# Patient Record
Sex: FEMALE | URBAN - METROPOLITAN AREA
[De-identification: names, ages, dates, MRNs, and addresses within clinical notes are randomized per-mention and may not be internally consistent; named-entity substitution may affect disease eponyms.]

---

## 2023-08-21 ENCOUNTER — APPOINTMENT (OUTPATIENT)
Dept: URBAN - METROPOLITAN AREA CLINIC 245 | Age: 48
Setting detail: DERMATOLOGY
End: 2023-08-22

## 2023-08-21 DIAGNOSIS — L259 CONTACT DERMATITIS AND OTHER ECZEMA, UNSPECIFIED CAUSE: ICD-10-CM

## 2023-08-21 DIAGNOSIS — L81.4 OTHER MELANIN HYPERPIGMENTATION: ICD-10-CM

## 2023-08-21 DIAGNOSIS — D22 MELANOCYTIC NEVI: ICD-10-CM

## 2023-08-21 DIAGNOSIS — D18.0 HEMANGIOMA: ICD-10-CM

## 2023-08-21 PROBLEM — L23.9 ALLERGIC CONTACT DERMATITIS, UNSPECIFIED CAUSE: Status: ACTIVE | Noted: 2023-08-21

## 2023-08-21 PROBLEM — D18.01 HEMANGIOMA OF SKIN AND SUBCUTANEOUS TISSUE: Status: ACTIVE | Noted: 2023-08-21

## 2023-08-21 PROBLEM — D22.5 MELANOCYTIC NEVI OF TRUNK: Status: ACTIVE | Noted: 2023-08-21

## 2023-08-21 PROCEDURE — OTHER COUNSELING: TOPICAL STEROIDS: OTHER

## 2023-08-21 PROCEDURE — OTHER PRESCRIPTION: OTHER

## 2023-08-21 PROCEDURE — OTHER COUNSELING: OTHER

## 2023-08-21 PROCEDURE — OTHER PRESCRIPTION MEDICATION MANAGEMENT: OTHER

## 2023-08-21 PROCEDURE — OTHER MIPS QUALITY: OTHER

## 2023-08-21 PROCEDURE — 99213 OFFICE O/P EST LOW 20 MIN: CPT

## 2023-08-21 RX ORDER — TRIAMCINOLONE ACETONIDE 1 MG/G
OINTMENT TOPICAL
Qty: 15 | Refills: 1 | Status: ERX | COMMUNITY
Start: 2023-08-21

## 2023-08-21 ASSESSMENT — LOCATION DETAILED DESCRIPTION DERM
LOCATION DETAILED: RIGHT MID-UPPER BACK
LOCATION DETAILED: LEFT SUPERIOR UPPER BACK
LOCATION DETAILED: PHILTRUM
LOCATION DETAILED: RIGHT INFERIOR UPPER BACK

## 2023-08-21 ASSESSMENT — LOCATION SIMPLE DESCRIPTION DERM
LOCATION SIMPLE: RIGHT UPPER BACK
LOCATION SIMPLE: UPPER LIP
LOCATION SIMPLE: LEFT UPPER BACK

## 2023-08-21 ASSESSMENT — LOCATION ZONE DERM
LOCATION ZONE: TRUNK
LOCATION ZONE: LIP

## 2023-08-21 NOTE — PROCEDURE: PRESCRIPTION MEDICATION MANAGEMENT
Initiate Treatment: Triamcinolone 0.1% ointment BID for 1 week
Render In Strict Bullet Format?: No
Detail Level: Zone

## 2024-06-19 ENCOUNTER — TELEPHONE (OUTPATIENT)
Dept: NEUROLOGY | Facility: CLINIC | Age: 49
End: 2024-06-19

## 2024-06-19 ENCOUNTER — OFFICE VISIT (OUTPATIENT)
Dept: NEUROLOGY | Facility: CLINIC | Age: 49
End: 2024-06-19

## 2024-06-19 VITALS
DIASTOLIC BLOOD PRESSURE: 87 MMHG | BODY MASS INDEX: 34 KG/M2 | RESPIRATION RATE: 16 BRPM | HEART RATE: 87 BPM | SYSTOLIC BLOOD PRESSURE: 138 MMHG | WEIGHT: 162 LBS | HEIGHT: 58 IN

## 2024-06-19 DIAGNOSIS — G43.109 MIGRAINE WITH AURA AND WITHOUT STATUS MIGRAINOSUS, NOT INTRACTABLE: Primary | ICD-10-CM

## 2024-06-19 PROCEDURE — 3075F SYST BP GE 130 - 139MM HG: CPT | Performed by: PHYSICIAN ASSISTANT

## 2024-06-19 PROCEDURE — 99203 OFFICE O/P NEW LOW 30 MIN: CPT | Performed by: PHYSICIAN ASSISTANT

## 2024-06-19 PROCEDURE — 3008F BODY MASS INDEX DOCD: CPT | Performed by: PHYSICIAN ASSISTANT

## 2024-06-19 PROCEDURE — 3079F DIAST BP 80-89 MM HG: CPT | Performed by: PHYSICIAN ASSISTANT

## 2024-06-19 RX ORDER — ONDANSETRON 4 MG/1
4 TABLET, ORALLY DISINTEGRATING ORAL EVERY 8 HOURS PRN
Qty: 20 TABLET | Refills: 0 | Status: SHIPPED | OUTPATIENT
Start: 2024-06-19

## 2024-06-19 RX ORDER — FEXOFENADINE HCL 60 MG/1
60 TABLET, FILM COATED ORAL
COMMUNITY

## 2024-06-19 RX ORDER — ALBUTEROL SULFATE 90 UG/1
2 AEROSOL, METERED RESPIRATORY (INHALATION) EVERY 6 HOURS PRN
COMMUNITY
Start: 2023-07-26

## 2024-06-19 RX ORDER — UBROGEPANT 100 MG/1
TABLET ORAL
Qty: 3 TABLET | Refills: 0 | COMMUNITY
Start: 2024-06-19 | End: 2025-06-19

## 2024-06-19 RX ORDER — RIZATRIPTAN BENZOATE 10 MG/1
10 TABLET ORAL
COMMUNITY

## 2024-06-19 NOTE — PROGRESS NOTES
NEUROLOGY  [facility]       Brit Ricks  5/27/1975  Primary Care Provider:  VIRGEN MOLINA    6/19/2024  49 year old female,      Seen for/plans last visit:  Migraines    Accompanied visit: No    Present condition:  Started with migraines in the 7th grade. She recalls that she got an aura in math class with associated headache and vomiting. The migraines did increase in frequency in highschool  She would get a visual aura and then about 20 minutes later headache. She did see neurologist in her 20's tried zomig and then maxalt. She did have a few during her pregnancy. Then about [age] years old she noticed an increase in the migraines. She is wondering if the increase in frequency is due to perimenopause. She is not sleeping well at night. No longer getting a consistent menstrual cycle. She has been dealing with mood issues.    Typically the headaches are right side of forehead and base of neck on right side. Can vary in intensity.  They vary in frequency. She has recently been waking up with them more often. There is associated +photophobia, +phonophobia, +nausea and occasional vomiting. She has experienced paresthesias with her migraines in the past.  She has not been getting the visual aura with her headaches recently. Can sometimes use ibpuorofen and ice pack.   There are times she has needed to take a second dose of the maxalt. She has not had any recent imaging of her brain.       Review of Systems:  Review of Systems:  Denies systemic symptoms     No CP or SOB.  No GI or  symptoms. Relevant Neuro as noted above.    Past Medical History:    Exercise-induced asthma (HCC)    Gestational diabetes (HCC)    Migraines       Medications:      Current Outpatient Medications:     albuterol (PROAIR HFA) 108 (90 Base) MCG/ACT Inhalation Aero Soln, Inhale 2 puffs into the lungs every 6 (six) hours as needed., Disp: , Rfl:     Rizatriptan Benzoate 10 MG Oral Tab, Take 1 tablet (10 mg total) by mouth.  TAKE  1 TABLET BY MOUTH AT ONSET OF HEADACHE AS NEEDED. MAY REPEAT IN 2 HOURS, Disp: , Rfl:     fexofenadine (ALLEGRA ALLERGY) 60 MG Oral Tab, Take 1 tablet (60 mg total) by mouth daily as needed., Disp: , Rfl:     ubrogepant (UBRELVY) 100 MG Oral Tab, Take one tablet at onset of migraine.  May take additional tablet in 2 hours if needed.  Do not exceed two tablets per 24 hour period., Disp: 3 tablet, Rfl: 0    ondansetron 4 MG Oral Tablet Dispersible, Take 1 tablet (4 mg total) by mouth every 8 (eight) hours as needed for Nausea., Disp: 20 tablet, Rfl: 0  PRN:     Allergies:  Allergies   Allergen Reactions    Latex OTHER (SEE COMMENTS)          EXAM:  /87 (BP Location: Left arm, Patient Position: Sitting, Cuff Size: adult)   Pulse 87   Resp 16   Ht 58\"   Wt 162 lb (73.5 kg)   LMP 05/23/2024 (Exact Date)   BMI 33.86 kg/m²   Looks stated age  General Exam:  HENT:  pink conjunctiva anicteric sclerae  Neck no adenopathy, thyroid normal  Heart and Lungs:  normal  Extremities: no cyanosis, skin changes    NEURO  NAD, A&Ox3  EOMI  CN II-XII intact  Strength 5/5 all extremties  DTRs 2+ and symmetric  FTN intact bilaterally  No drift on exam  Normal gait  Tandem gait intact  Romberg negative      Problem/s Identified this visit:   1. Migraine with aura and without status migrainosus, not intractable          Discussion plus Diagnostics & Treatment Orders:    Patient is a 49 year old female here with hx of migraines since she was a kid but have recently increased in frequency and there has been a change in that she is waking up with them and not having her typical associated visual aura. Will get MRI Brain to further evaluate. She was given samples of ubrelvy to try for abortive tx. Discussed possible side effects of Ubrelvy with patient. If effective she was instructed to call the office for a prescription. If not effective then can continue the maxalt for abortive tx.    (X) Discussed potential side effects of any  treatment relevant to above.  Includes explanation of tests as necessary.    Return in about 3 months (around 9/19/2024).      Patient understands that if needed, based on condition and or test results, follow up will be readjusted      Pat Zavala PA-C  [facility]  6/19/2024, Time completed 10:22 AM

## 2024-06-19 NOTE — TELEPHONE ENCOUNTER
Patient advised she would like to compelete MRI imaging ordered by Pat Zavala at [facility].     [address], [place] [address] //  [phone]    Please advise prior authorization and call patient when approved.

## 2024-06-19 NOTE — PATIENT INSTRUCTIONS
Refill policies:    Allow 2-3 business days for refills; controlled substances may take longer.  Contact your pharmacy at least 5 days prior to running out of medication and have them send an electronic request or submit request through the “request refill” option in your [employer] account.  Refills are not addressed on weekends; covering physicians do not authorize routine medications on weekends.  No narcotics or controlled substances are refilled after noon on Fridays or by on call physicians.  By law, narcotics must be electronically prescribed.  A 30 day supply with no refills is the maximum allowed.  If your prescription is due for a refill, you may be due for a follow up appointment.  To best provide you care, patients receiving routine medications need to be seen at least once a year.  Patients receiving narcotic/controlled substance medications need to be seen at least once every 3 months.  In the event that your preferred pharmacy does not have the requested medication in stock (e.g. Backordered), it is your responsibility to find another pharmacy that has the requested medication available.  We will gladly send a new prescription to that pharmacy at your request.    Scheduling Tests:    If your physician has ordered radiology tests such as MRI or CT scans, please contact Central Scheduling at [phone] right away to schedule the test.  Once scheduled, the [facility] Centralized Referral Team will work with your insurance carrier to obtain pre-certification or prior authorization.  Depending on your insurance carrier, approval may take 3-10 days.  It is highly recommended patients assure they have received an authorization before having a test performed.  If test is done without insurance authorization, patient may be responsible for the entire amount billed.      Precertification and Prior Authorizations:  If your physician has recommended that you have a procedure or additional testing performed the [facility]  Centralized Referral Team will contact your insurance carrier to obtain pre-certification or prior authorization.    You are strongly encouraged to contact your insurance carrier to verify that your procedure/test has been approved and is a COVERED benefit.  Although the [facility] Centralized Referral Team does its due diligence, the insurance carrier gives the disclaimer that \"Although the procedure is authorized, this does not guarantee payment.\"    Ultimately the patient is responsible for payment.   Thank you for your understanding in this matter.  Paperwork Completion:  If you require FMLA or disability paperwork for your recovery, please make sure to either drop it off or have it faxed to our office at [phone]. Be sure the form has your name and date of birth on it.  The form will be faxed to our Forms Department and they will complete it for you.  There is a 25$ fee for all forms that need to be filled out.  Please be aware there is a 10-14 day turnaround time.  You will need to sign a release of information (JUAN MANUEL) form if your paperwork does not come with one.  You may call the Forms Department with any questions at [phone].  Their fax number is [phone].

## 2024-07-16 NOTE — TELEPHONE ENCOUNTER
Medication: Ubrelvy     Date of last refill: n/a (#3/0)  [place]     Last office visit: 6/19/24  Due back to clinic per last office note:  Return in about 3 months   Date next office visit scheduled:    Future Appointments   Date Time Provider Department Center   9/4/2024  3:00 PM Pat Zavala PA [facility]           Last OV note recommendation:    Problem/s Identified this visit:   1. Migraine with aura and without status migrainosus, not intractable             Discussion plus Diagnostics & Treatment Orders:     Patient is a 49 year old female here with hx of migraines since she was a kid but have recently increased in frequency and there has been a change in that she is waking up with them and not having her typical associated visual aura. Will get MRI Brain to further evaluate. She was given samples of ubrelvy to try for abortive tx. Discussed possible side effects of Ubrelvy with patient. If effective she was instructed to call the office for a prescription. If not effective then can continue the maxalt for abortive tx.     (X) Discussed potential side effects of any treatment relevant to above.  Includes explanation of tests as necessary.     Return in about 3 months (around 9/19/2024).        Patient understands that if needed, based on condition and or test results, follow up will be readjusted        Pat Zavala PA-C

## 2024-07-17 RX ORDER — UBROGEPANT 100 MG/1
TABLET ORAL
Qty: 10 TABLET | Refills: 3 | Status: SHIPPED | OUTPATIENT
Start: 2024-07-17 | End: 2025-07-16

## 2024-09-04 ENCOUNTER — TELEPHONE (OUTPATIENT)
Dept: NEUROLOGY | Facility: CLINIC | Age: 49
End: 2024-09-04

## 2024-09-04 ENCOUNTER — OFFICE VISIT (OUTPATIENT)
Dept: NEUROLOGY | Facility: CLINIC | Age: 49
End: 2024-09-04
Payer: COMMERCIAL

## 2024-09-04 VITALS
SYSTOLIC BLOOD PRESSURE: 137 MMHG | BODY MASS INDEX: 36.74 KG/M2 | HEIGHT: 58.5 IN | DIASTOLIC BLOOD PRESSURE: 90 MMHG | RESPIRATION RATE: 18 BRPM | WEIGHT: 179.81 LBS | HEART RATE: 78 BPM

## 2024-09-04 DIAGNOSIS — G43.109 MIGRAINE WITH AURA AND WITHOUT STATUS MIGRAINOSUS, NOT INTRACTABLE: Primary | ICD-10-CM

## 2024-09-04 DIAGNOSIS — M26.643 ARTHRITIS OF BOTH TEMPOROMANDIBULAR JOINTS: ICD-10-CM

## 2024-09-04 PROCEDURE — 3080F DIAST BP >= 90 MM HG: CPT | Performed by: PHYSICIAN ASSISTANT

## 2024-09-04 PROCEDURE — 3008F BODY MASS INDEX DOCD: CPT | Performed by: PHYSICIAN ASSISTANT

## 2024-09-04 PROCEDURE — 3075F SYST BP GE 130 - 139MM HG: CPT | Performed by: PHYSICIAN ASSISTANT

## 2024-09-04 PROCEDURE — 99213 OFFICE O/P EST LOW 20 MIN: CPT | Performed by: PHYSICIAN ASSISTANT

## 2024-09-04 RX ORDER — FREMANEZUMAB-VFRM 225 MG/1.5ML
225 INJECTION SUBCUTANEOUS
Qty: 1 EACH | Refills: 5 | Status: SHIPPED | OUTPATIENT
Start: 2024-09-04

## 2024-09-04 RX ORDER — FREMANEZUMAB-VFRM 225 MG/1.5ML
225 INJECTION SUBCUTANEOUS
Qty: 2 EACH | Refills: 0 | COMMUNITY
Start: 2024-09-04

## 2024-09-04 NOTE — TELEPHONE ENCOUNTER
Patient presented to office with MRI imaging from [facility]. Uploaded the following studies via [employer] reader:    MRI Brain w/w.o DOS: 8/3/24    Disk returned to patient during appointment.

## 2024-09-04 NOTE — PATIENT INSTRUCTIONS
Refill policies:    Allow 2-3 business days for refills; controlled substances may take longer.  Contact your pharmacy at least 5 days prior to running out of medication and have them send an electronic request or submit request through the “request refill” option in your [employer] account.  Refills are not addressed on weekends; covering physicians do not authorize routine medications on weekends.  No narcotics or controlled substances are refilled after noon on Fridays or by on call physicians.  By law, narcotics must be electronically prescribed.  A 30 day supply with no refills is the maximum allowed.  If your prescription is due for a refill, you may be due for a follow up appointment.  To best provide you care, patients receiving routine medications need to be seen at least once a year.  Patients receiving narcotic/controlled substance medications need to be seen at least once every 3 months.  In the event that your preferred pharmacy does not have the requested medication in stock (e.g. Backordered), it is your responsibility to find another pharmacy that has the requested medication available.  We will gladly send a new prescription to that pharmacy at your request.    Scheduling Tests:    If your physician has ordered radiology tests such as MRI or CT scans, please contact Central Scheduling at [phone] right away to schedule the test.  Once scheduled, the [facility] Centralized Referral Team will work with your insurance carrier to obtain pre-certification or prior authorization.  Depending on your insurance carrier, approval may take 3-10 days.  It is highly recommended patients assure they have received an authorization before having a test performed.  If test is done without insurance authorization, patient may be responsible for the entire amount billed.      Precertification and Prior Authorizations:  If your physician has recommended that you have a procedure or additional testing performed the [facility]  Centralized Referral Team will contact your insurance carrier to obtain pre-certification or prior authorization.    You are strongly encouraged to contact your insurance carrier to verify that your procedure/test has been approved and is a COVERED benefit.  Although the [facility] Centralized Referral Team does its due diligence, the insurance carrier gives the disclaimer that \"Although the procedure is authorized, this does not guarantee payment.\"    Ultimately the patient is responsible for payment.   Thank you for your understanding in this matter.  Paperwork Completion:  If you require FMLA or disability paperwork for your recovery, please make sure to either drop it off or have it faxed to our office at [phone]. Be sure the form has your name and date of birth on it.  The form will be faxed to our Forms Department and they will complete it for you.  There is a 25$ fee for all forms that need to be filled out.  Please be aware there is a 10-14 day turnaround time.  You will need to sign a release of information (JUAN MANUEL) form if your paperwork does not come with one.  You may call the Forms Department with any questions at [phone].  Their fax number is [phone].

## 2024-09-04 NOTE — PROGRESS NOTES
NEUROLOGY  [facility]       Previous visit and existing record notes reviewed in preparation for the face to face visit.  Relevant labs and studies reviewed and will be noted in relevant areas of this record.    Brit Ricks  5/27/1975  Primary Care Provider:  VIRGEN MOLINA    9/4/2024  49 year old female,      was last seen on: Patient was last seen 6/19/24 for first visit with hx of migraines since she was a kid with increase in frequency in migraines around [age] and thought it might be due to perimenopause. At that time an MRI Brain was ordered and she was given ubrelvy for abortive tx    Seen for/plans last visit:  Migraines    Accompanied visit: No    Present condition:  Since her last visit she has tried the ubrelvy and it does work to abort the headache. She tolerates it without side effects. The headaches have increased in frequency in the last couple of weeks. She is an  and is under increased stress due to some murder trials she has upcoming. She is also not sleeping well at night. She is considering trying some hormone replacement therapy for her perimenopause symptoms. She is concerned about stroke risk with taking hormone replacement. She is interested in possibly trying a preventative medications for her headaches  She brought her MRI images for review.     MRI Brain(8/3/24)  Impression:  Normal brain.  Appearance of an enlarged partially empty sella  High Grade R>L TMJ DJD, a possible association with/cause of stress/tension headaches    Past History update/new problem(s):     Review of Systems:  Review of Systems:  Denies systemic symptoms     No CP or SOB.  No GI or  symptoms. Relevant Neuro as noted above.    Past Medical History:    Exercise-induced asthma (HCC)    Gestational diabetes (HCC)    Migraines     Medications:      Current Outpatient Medications:     APPLE CIDER VINEGAR OR, Take by mouth., Disp: , Rfl:     Fremanezumab-vfrm (AJOVY) 225 MG/1.5ML Subcutaneous  Solution Auto-injector, Inject 225 mg into the skin every 30 (thirty) days., Disp: 1 each, Rfl: 5    Fremanezumab-vfrm (AJOVY) 225 MG/1.5ML Subcutaneous Solution Auto-injector, Inject 225 mg into the skin every 30 (thirty) days., Disp: 2 each, Rfl: 0    ubrogepant (UBRELVY) 100 MG Oral Tab, Take one tablet at onset of migraine.  May take additional tablet in 2 hours if needed.  Do not exceed two tablets per 24 hour period., Disp: 10 tablet, Rfl: 3    albuterol (PROAIR HFA) 108 (90 Base) MCG/ACT Inhalation Aero Soln, Inhale 2 puffs into the lungs every 6 (six) hours as needed., Disp: , Rfl:     Rizatriptan Benzoate 10 MG Oral Tab, Take 1 tablet (10 mg total) by mouth.  TAKE 1 TABLET BY MOUTH AT ONSET OF HEADACHE AS NEEDED. MAY REPEAT IN 2 HOURS, Disp: , Rfl:     fexofenadine (ALLEGRA ALLERGY) 60 MG Oral Tab, Take 1 tablet (60 mg total) by mouth daily as needed., Disp: , Rfl:     ondansetron 4 MG Oral Tablet Dispersible, Take 1 tablet (4 mg total) by mouth every 8 (eight) hours as needed for Nausea., Disp: 20 tablet, Rfl: 0    VITAMIN D, CHOLECALCIFEROL, OR, As Directed., Disp: , Rfl:     IUD'S IU, IUD's, Disp: , Rfl:   PRN:     Allergies:  Allergies   Allergen Reactions    Hydrocodone-Acetaminophen NAUSEA AND VOMITING    Latex OTHER (SEE COMMENTS)          EXAM:  /90 (BP Location: Right arm, Patient Position: Sitting, Cuff Size: adult)   Pulse 78   Resp 18   Ht 58.5\"   Wt 179 lb 12.8 oz (81.6 kg)   LMP 07/25/2024 (Exact Date)   BMI 36.94 kg/m²   Looks stated age  General Exam:  HENT:  pink conjunctiva anicteric sclerae  Neck no adenopathy, thyroid normal  Heart and Lungs:  normal  Extremities: no cyanosis, skin changes    NEURO  NAD, A&Ox3  EOMI  CN II-XII intact  Strength 5/5 all extremities  DTRs 2+ and symmetric  FTN intact bilaterally  No drift on exam  Normal gait  Tandem gait intact  Romberg negative      Problem/s Identified this visit:   1. Migraine with aura and without status migrainosus, not  intractable    2. Arthritis of both temporomandibular joints          Discussion plus Diagnostics & Treatment Orders:      Migraines- will start her on Ajovy for headache prevention. She was given her first injection of the Ajovy in the office today. She can continue the Maxalt or ubrelvy for abortive tx.    TMJ Arthritis- Recommended use of mouth guard nightly.    Patient provided stroke risk information from [employer] regarding combined estrogen-progestin hormone therapy      (X) Discussed potential side effects of any treatment relevant to above.  Includes explanation of tests as necessary.    Return in about 3 months (around 12/4/2024).      Patient understands that if needed, based on condition and or test results, follow up will be readjusted    Pat Zavala PA-C  [facility]  9/4/2024, Time completed 3:27 PM

## 2024-09-04 NOTE — PROGRESS NOTES
Provided patient with two samples of AJOVY during office visit.  Education completed for administration and patient administered one dose in office without incident.  All questions answered.

## 2024-09-04 NOTE — TELEPHONE ENCOUNTER
Called [facility] to request MRI Brain results report faxed to this office. Spoke to Venu who will fax document.

## 2024-09-05 ENCOUNTER — PATIENT MESSAGE (OUTPATIENT)
Dept: NEUROLOGY | Facility: CLINIC | Age: 49
End: 2024-09-05

## 2024-09-06 NOTE — TELEPHONE ENCOUNTER
From: Brit Ricks  Sent: 9/5/2024 8:14 PM CDT  To: Xiomara Najera Nurse  Subject: MRI Brain Results    Oh. Quick question, would the dentist see evidence of the teeth grinding?    Brit

## 2024-09-09 NOTE — TELEPHONE ENCOUNTER
Brit BARFIELD Xiomara Herington Nurse (supporting You)2 days ago     NICOLE Stewart,      Good morning.  I have one more follow up question regarding my MRI results.       \"Appearance of an enlarged partially empty sella\"     What does this mean?  I reviewed enough information to know the sella houses my pituitary gland, which I know controls release of hormones.   Is this not a concern?  If so, can you explain why not?  (It isn't possible to research even basic questions without all the worst case scenarios popping up.)     Brit

## 2024-12-05 ENCOUNTER — OFFICE VISIT (OUTPATIENT)
Dept: NEUROLOGY | Facility: CLINIC | Age: 49
End: 2024-12-05
Payer: COMMERCIAL

## 2024-12-05 VITALS
SYSTOLIC BLOOD PRESSURE: 138 MMHG | HEART RATE: 101 BPM | DIASTOLIC BLOOD PRESSURE: 81 MMHG | BODY MASS INDEX: 37.59 KG/M2 | WEIGHT: 184 LBS | HEIGHT: 58.5 IN | RESPIRATION RATE: 16 BRPM

## 2024-12-05 DIAGNOSIS — G43.109 MIGRAINE WITH AURA AND WITHOUT STATUS MIGRAINOSUS, NOT INTRACTABLE: Primary | ICD-10-CM

## 2024-12-05 DIAGNOSIS — M26.643 ARTHRITIS OF BOTH TEMPOROMANDIBULAR JOINTS: ICD-10-CM

## 2024-12-05 PROCEDURE — 3075F SYST BP GE 130 - 139MM HG: CPT | Performed by: PHYSICIAN ASSISTANT

## 2024-12-05 PROCEDURE — 3008F BODY MASS INDEX DOCD: CPT | Performed by: PHYSICIAN ASSISTANT

## 2024-12-05 PROCEDURE — 3079F DIAST BP 80-89 MM HG: CPT | Performed by: PHYSICIAN ASSISTANT

## 2024-12-05 PROCEDURE — 99213 OFFICE O/P EST LOW 20 MIN: CPT | Performed by: PHYSICIAN ASSISTANT

## 2024-12-05 RX ORDER — MULTIVITAMIN WITH IRON
250 TABLET ORAL
COMMUNITY

## 2024-12-05 RX ORDER — UBROGEPANT 100 MG/1
TABLET ORAL
Qty: 10 TABLET | Refills: 5 | Status: SHIPPED | OUTPATIENT
Start: 2024-12-05 | End: 2025-12-04

## 2024-12-05 NOTE — PATIENT INSTRUCTIONS
Refill policies:    Allow 2-3 business days for refills; controlled substances may take longer.  Contact your pharmacy at least 5 days prior to running out of medication and have them send an electronic request or submit request through the “request refill” option in your [employer] account.  Refills are not addressed on weekends; covering physicians do not authorize routine medications on weekends.  No narcotics or controlled substances are refilled after noon on Fridays or by on call physicians.  By law, narcotics must be electronically prescribed.  A 30 day supply with no refills is the maximum allowed.  If your prescription is due for a refill, you may be due for a follow up appointment.  To best provide you care, patients receiving routine medications need to be seen at least once a year.  Patients receiving narcotic/controlled substance medications need to be seen at least once every 3 months.  In the event that your preferred pharmacy does not have the requested medication in stock (e.g. Backordered), it is your responsibility to find another pharmacy that has the requested medication available.  We will gladly send a new prescription to that pharmacy at your request.    Scheduling Tests:    If your physician has ordered radiology tests such as MRI or CT scans, please contact Central Scheduling at [phone] right away to schedule the test.  Once scheduled, the [facility] Centralized Referral Team will work with your insurance carrier to obtain pre-certification or prior authorization.  Depending on your insurance carrier, approval may take 3-10 days.  It is highly recommended patients assure they have received an authorization before having a test performed.  If test is done without insurance authorization, patient may be responsible for the entire amount billed.      Precertification and Prior Authorizations:  If your physician has recommended that you have a procedure or additional testing performed the [facility]  Centralized Referral Team will contact your insurance carrier to obtain pre-certification or prior authorization.    You are strongly encouraged to contact your insurance carrier to verify that your procedure/test has been approved and is a COVERED benefit.  Although the [facility] Centralized Referral Team does its due diligence, the insurance carrier gives the disclaimer that \"Although the procedure is authorized, this does not guarantee payment.\"    Ultimately the patient is responsible for payment.   Thank you for your understanding in this matter.  Paperwork Completion:  If you require FMLA or disability paperwork for your recovery, please make sure to either drop it off or have it faxed to our office at [phone]. Be sure the form has your name and date of birth on it.  The form will be faxed to our Forms Department and they will complete it for you.  There is a 25$ fee for all forms that need to be filled out.  Please be aware there is a 10-14 day turnaround time.  You will need to sign a release of information (JUAN MANUEL) form if your paperwork does not come with one.  You may call the Forms Department with any questions at [phone].  Their fax number is [phone].

## 2024-12-05 NOTE — PROGRESS NOTES
NEUROLOGY  [facility]       Previous visit and existing record notes reviewed in preparation for the face to face visit.  Relevant labs and studies reviewed and will be noted in relevant areas of this record.    Brit Ricks  5/27/1975  Primary Care Provider:  VIRGEN MOLINA    12/5/2024  49 year old female      was last seen on: Patient was last seen 9/4/24 and at that time had tried the ubrelvy and was effective for abortive tx. We then decided to have her proceed with Ajovy for headache prevention.     Seen for/plans last visit:  Migraines    Accompanied visit: No    Present condition:  Since her last visit the migraines have been well controlled  Currently getting about 3-4 migraines a month. Prior to the Ajovy was getting 2-3 times a week  The ubrelvy is working well to abort the headache. Providing relief in about an hour  She has no new complaints today  She has not started using a mouth guard      MRI Brain(8/3/24)  Impression:  Normal brain.  Appearance of an enlarged partially empty sella  High Grade R>L TMJ DJD, a possible association with/cause of stress/tension headaches    Review of Systems:  Review of Systems:  Denies systemic symptoms     No CP or SOB.  No GI or  symptoms. Relevant Neuro as noted above.    Past Medical History:    Exercise-induced asthma (HCC)    Gestational diabetes (HCC)    Migraines       Medications:      Current Outpatient Medications:     magnesium 250 MG Oral Tab, Take 1 tablet (250 mg total) by mouth., Disp: , Rfl:     ubrogepant (UBRELVY) 100 MG Oral Tab, Take one tablet at onset of migraine.  May take additional tablet in 2 hours if needed.  Do not exceed two tablets per 24 hour period., Disp: 10 tablet, Rfl: 5    VITAMIN D, CHOLECALCIFEROL, OR, As Directed., Disp: , Rfl:     IUD'S IU, IUD's, Disp: , Rfl:     APPLE CIDER VINEGAR OR, Take by mouth., Disp: , Rfl:     Fremanezumab-vfrm (AJOVY) 225 MG/1.5ML Subcutaneous Solution Auto-injector, Inject 225 mg  into the skin every 30 (thirty) days., Disp: 1 each, Rfl: 5    albuterol (PROAIR HFA) 108 (90 Base) MCG/ACT Inhalation Aero Soln, Inhale 2 puffs into the lungs every 6 (six) hours as needed., Disp: , Rfl:     Rizatriptan Benzoate 10 MG Oral Tab, Take 1 tablet (10 mg total) by mouth.  TAKE 1 TABLET BY MOUTH AT ONSET OF HEADACHE AS NEEDED. MAY REPEAT IN 2 HOURS, Disp: , Rfl:     fexofenadine (ALLEGRA ALLERGY) 60 MG Oral Tab, Take 1 tablet (60 mg total) by mouth daily as needed., Disp: , Rfl:     ondansetron 4 MG Oral Tablet Dispersible, Take 1 tablet (4 mg total) by mouth every 8 (eight) hours as needed for Nausea., Disp: 20 tablet, Rfl: 0  PRN:     Allergies:  Allergies[1]       EXAM:  /81 (BP Location: Left arm, Patient Position: Sitting, Cuff Size: large)   Pulse 101   Resp 16   Ht 58.5\"   Wt 184 lb (83.5 kg)   LMP 07/25/2024 (Exact Date)   BMI 37.80 kg/m²   Looks stated age  General Exam:  HENT:  pink conjunctiva anicteric sclerae  Neck no adenopathy, thyroid normal  Heart and Lungs:  normal  Extremities: no cyanosis, skin changes    NEURO  NAD, A&Ox3  EOMI  CN II-XII intact  Strength 5/5 all extremities  DTRs 2+ and symmetric  FTN intact bilaterally  No drift on exam  Normal gait  Tandem gait intact  Romberg negative      Problem/s Identified this visit:   1. Migraine with aura and without status migrainosus, not intractable    2. Arthritis of both temporomandibular joints          Discussion plus Diagnostics & Treatment Orders:      Migraines- currently well controlled. Continue the Ajovy monthly for headache prevention. Use the ubrelvy prn for abortive tx     TMJ Arthritis- Again recommended use of mouthguard. She expressed full understanding      (X) Discussed potential side effects of any treatment relevant to above.  Includes explanation of tests as necessary.    Return in about 1 year (around 12/5/2025).      Patient understands that if needed, based on condition and or test results, follow up  will be readjusted    Pat Zavala PA-C  [facility]  12/5/2024, Time completed 3:28 PM               [1]   Allergies  Allergen Reactions    Hydrocodone-Acetaminophen NAUSEA AND VOMITING    Latex OTHER (SEE COMMENTS)

## 2025-05-01 ENCOUNTER — APPOINTMENT (OUTPATIENT)
Dept: URBAN - METROPOLITAN AREA CLINIC 245 | Age: 50
Setting detail: DERMATOLOGY
End: 2025-05-02

## 2025-05-01 DIAGNOSIS — D18.0 HEMANGIOMA: ICD-10-CM

## 2025-05-01 DIAGNOSIS — L82.1 OTHER SEBORRHEIC KERATOSIS: ICD-10-CM

## 2025-05-01 DIAGNOSIS — L81.4 OTHER MELANIN HYPERPIGMENTATION: ICD-10-CM

## 2025-05-01 PROBLEM — D18.01 HEMANGIOMA OF SKIN AND SUBCUTANEOUS TISSUE: Status: ACTIVE | Noted: 2025-05-01

## 2025-05-01 PROCEDURE — OTHER COUNSELING: OTHER

## 2025-05-01 PROCEDURE — OTHER MIPS QUALITY: OTHER

## 2025-05-01 PROCEDURE — 99213 OFFICE O/P EST LOW 20 MIN: CPT

## 2025-05-01 ASSESSMENT — LOCATION DETAILED DESCRIPTION DERM
LOCATION DETAILED: MIDDLE STERNUM
LOCATION DETAILED: RIGHT MEDIAL SUPERIOR CHEST
LOCATION DETAILED: LEFT INFERIOR MEDIAL UPPER BACK
LOCATION DETAILED: LEFT INFERIOR MEDIAL FOREHEAD

## 2025-05-01 ASSESSMENT — LOCATION SIMPLE DESCRIPTION DERM
LOCATION SIMPLE: LEFT FOREHEAD
LOCATION SIMPLE: CHEST
LOCATION SIMPLE: LEFT UPPER BACK

## 2025-05-01 ASSESSMENT — LOCATION ZONE DERM
LOCATION ZONE: FACE
LOCATION ZONE: TRUNK

## 2025-05-12 RX ORDER — FREMANEZUMAB-VFRM 225 MG/1.5ML
1.5 INJECTION SUBCUTANEOUS
Qty: 1.5 ML | Refills: 5 | Status: SHIPPED | OUTPATIENT
Start: 2025-05-12

## 2025-05-12 NOTE — TELEPHONE ENCOUNTER
Medication: Ajovy 2225 mg     Date of last refill: 09/04/2024 # 1/5  [place]      Last office visit: 12/05/2024  Due back to clinic per last office note:    Date next office visit scheduled:    No future appointments.        Last OV note recommendation:    Discussion plus Diagnostics & Treatment Orders:        Migraines- currently well controlled. Continue the Ajovy monthly for headache prevention. Use the ubrelvy prn for abortive tx      TMJ Arthritis- Again recommended use of mouthguard. She expressed full understanding        (X) Discussed potential side effects of any treatment relevant to above.  Includes explanation of tests as necessary.     Return in about 1 year (around 12/5/2025).